# Patient Record
Sex: MALE | Race: WHITE
[De-identification: names, ages, dates, MRNs, and addresses within clinical notes are randomized per-mention and may not be internally consistent; named-entity substitution may affect disease eponyms.]

---

## 2020-01-08 ENCOUNTER — HOSPITAL ENCOUNTER (OUTPATIENT)
Dept: HOSPITAL 84 - D.LABREF | Age: 65
Discharge: HOME | End: 2020-01-08
Attending: ORTHOPAEDIC SURGERY
Payer: MEDICARE

## 2020-01-08 DIAGNOSIS — M17.12: Primary | ICD-10-CM

## 2020-01-22 ENCOUNTER — HOSPITAL ENCOUNTER (INPATIENT)
Dept: HOSPITAL 84 - D.SDCHOLD | Age: 65
LOS: 9 days | Discharge: HOME HEALTH | DRG: 470 | End: 2020-01-31
Attending: ORTHOPAEDIC SURGERY | Admitting: ORTHOPAEDIC SURGERY
Payer: MEDICARE

## 2020-01-22 VITALS
HEIGHT: 70 IN | BODY MASS INDEX: 28.98 KG/M2 | BODY MASS INDEX: 28.98 KG/M2 | WEIGHT: 202.42 LBS | BODY MASS INDEX: 28.98 KG/M2

## 2020-01-22 DIAGNOSIS — Z85.038: ICD-10-CM

## 2020-01-22 DIAGNOSIS — M17.12: Primary | ICD-10-CM

## 2020-01-22 DIAGNOSIS — I10: ICD-10-CM

## 2020-01-22 LAB
ANION GAP SERPL CALC-SCNC: 8.5 MMOL/L (ref 8–16)
APPEARANCE UR: CLEAR
APTT BLD: 27.1 SECONDS (ref 22.8–39.4)
BASOPHILS NFR BLD AUTO: 0.4 % (ref 0–2)
BILIRUB SERPL-MCNC: NEGATIVE MG/DL
BUN SERPL-MCNC: 10 MG/DL (ref 7–18)
CALCIUM SERPL-MCNC: 8.7 MG/DL (ref 8.5–10.1)
CHLORIDE SERPL-SCNC: 107 MMOL/L (ref 98–107)
CO2 SERPL-SCNC: 31.1 MMOL/L (ref 21–32)
COLOR UR: YELLOW
CREAT SERPL-MCNC: 1.1 MG/DL (ref 0.6–1.3)
EOSINOPHIL NFR BLD: 2 % (ref 0–7)
ERYTHROCYTE [DISTWIDTH] IN BLOOD BY AUTOMATED COUNT: 12.7 % (ref 11.5–14.5)
GLUCOSE SERPL-MCNC: 110 MG/DL (ref 74–106)
GLUCOSE SERPL-MCNC: NEGATIVE MG/DL
HCT VFR BLD CALC: 44.3 % (ref 42–54)
HGB BLD-MCNC: 15.5 G/DL (ref 13.5–17.5)
IMM GRANULOCYTES NFR BLD: 0.2 % (ref 0–5)
INR PPP: 1 (ref 0.85–1.17)
KETONES UR STRIP-MCNC: NEGATIVE MG/DL
LYMPHOCYTES NFR BLD AUTO: 25.2 % (ref 15–50)
MCH RBC QN AUTO: 31.9 PG (ref 26–34)
MCHC RBC AUTO-ENTMCNC: 35 G/DL (ref 31–37)
MCV RBC: 91.2 FL (ref 80–100)
MONOCYTES NFR BLD: 17 % (ref 2–11)
NEUTROPHILS NFR BLD AUTO: 55.2 % (ref 40–80)
NITRITE UR-MCNC: NEGATIVE MG/ML
OSMOLALITY SERPL CALC.SUM OF ELEC: 284 MOSM/KG (ref 275–300)
PH UR STRIP: 7 [PH] (ref 5–6)
PLATELET # BLD: 208 10X3/UL (ref 130–400)
PMV BLD AUTO: 11 FL (ref 7.4–10.4)
POTASSIUM SERPL-SCNC: 3.6 MMOL/L (ref 3.5–5.1)
PROT UR-MCNC: NEGATIVE MG/DL
PROTHROMBIN TIME: 13.2 SECONDS (ref 11.6–15)
RBC # BLD AUTO: 4.86 10X6/UL (ref 4.2–6.1)
SODIUM SERPL-SCNC: 143 MMOL/L (ref 136–145)
SP GR UR STRIP: 1.01 (ref 1–1.02)
UROBILINOGEN UR-MCNC: NORMAL MG/DL
WBC # BLD AUTO: 5.1 10X3/UL (ref 4.8–10.8)

## 2020-01-29 VITALS — SYSTOLIC BLOOD PRESSURE: 106 MMHG | DIASTOLIC BLOOD PRESSURE: 73 MMHG

## 2020-01-29 VITALS — DIASTOLIC BLOOD PRESSURE: 74 MMHG | SYSTOLIC BLOOD PRESSURE: 115 MMHG

## 2020-01-29 VITALS — DIASTOLIC BLOOD PRESSURE: 84 MMHG | SYSTOLIC BLOOD PRESSURE: 130 MMHG

## 2020-01-29 VITALS — SYSTOLIC BLOOD PRESSURE: 115 MMHG | DIASTOLIC BLOOD PRESSURE: 74 MMHG

## 2020-01-29 VITALS — DIASTOLIC BLOOD PRESSURE: 76 MMHG | SYSTOLIC BLOOD PRESSURE: 127 MMHG

## 2020-01-29 PROCEDURE — 0SRD0J9 REPLACEMENT OF LEFT KNEE JOINT WITH SYNTHETIC SUBSTITUTE, CEMENTED, OPEN APPROACH: ICD-10-PCS | Performed by: ORTHOPAEDIC SURGERY

## 2020-01-29 NOTE — NUR
REC'D WALKING ROUNDS CHGE OF SHIFT DRSG DRY AND INTACT LEFT KNEE WITH WOUND
VAC IN PLACE KELVIN FERRARO CPM [place] FOOT PINK AND WARM PEADAL PULSE PRESENT
STATES BLK HAS WORN
OFF ALREADY RATING PAIN5 ON 1-10 PAIN SCALE. WILL CONTINUE TO MONITOR FOR ANY
CHGES IN NEUROVASCULAR STATUS AND FOLLOW CURRENT PLAN OF CARE

## 2020-01-29 NOTE — NUR
OT NOTE: PT COMPLETED BED MOB WITH MOD A. PT COMPLETED FACE WASH WITH SETUP.
[phone]
THANK YOU, KISHA HAWTHORNE

## 2020-01-30 VITALS — SYSTOLIC BLOOD PRESSURE: 115 MMHG | DIASTOLIC BLOOD PRESSURE: 73 MMHG

## 2020-01-30 VITALS — DIASTOLIC BLOOD PRESSURE: 78 MMHG | SYSTOLIC BLOOD PRESSURE: 129 MMHG

## 2020-01-30 VITALS — SYSTOLIC BLOOD PRESSURE: 146 MMHG | DIASTOLIC BLOOD PRESSURE: 93 MMHG

## 2020-01-30 VITALS — SYSTOLIC BLOOD PRESSURE: 148 MMHG | DIASTOLIC BLOOD PRESSURE: 76 MMHG

## 2020-01-30 VITALS — SYSTOLIC BLOOD PRESSURE: 128 MMHG | DIASTOLIC BLOOD PRESSURE: 82 MMHG

## 2020-01-30 VITALS — SYSTOLIC BLOOD PRESSURE: 113 MMHG | DIASTOLIC BLOOD PRESSURE: 68 MMHG

## 2020-01-30 LAB
ALBUMIN SERPL-MCNC: 3.4 G/DL (ref 3.4–5)
ALP SERPL-CCNC: 51 U/L (ref 30–120)
ALT SERPL-CCNC: 22 U/L (ref 10–68)
ANION GAP SERPL CALC-SCNC: 13 MMOL/L (ref 8–16)
BASOPHILS NFR BLD AUTO: 0 % (ref 0–2)
BILIRUB SERPL-MCNC: 0.45 MG/DL (ref 0.2–1.3)
BUN SERPL-MCNC: 19 MG/DL (ref 7–18)
CALCIUM SERPL-MCNC: 8 MG/DL (ref 8.5–10.1)
CHLORIDE SERPL-SCNC: 104 MMOL/L (ref 98–107)
CO2 SERPL-SCNC: 27.4 MMOL/L (ref 21–32)
CREAT SERPL-MCNC: 1.3 MG/DL (ref 0.6–1.3)
EOSINOPHIL NFR BLD: 0 % (ref 0–7)
ERYTHROCYTE [DISTWIDTH] IN BLOOD BY AUTOMATED COUNT: 12.6 % (ref 11.5–14.5)
GLOBULIN SER-MCNC: 2.7 G/L
GLUCOSE SERPL-MCNC: 160 MG/DL (ref 74–106)
HCT VFR BLD CALC: 40.7 % (ref 42–54)
HGB BLD-MCNC: 14.2 G/DL (ref 13.5–17.5)
IMM GRANULOCYTES NFR BLD: 0.2 % (ref 0–5)
LYMPHOCYTES NFR BLD AUTO: 7.5 % (ref 15–50)
MCH RBC QN AUTO: 31.7 PG (ref 26–34)
MCHC RBC AUTO-ENTMCNC: 34.9 G/DL (ref 31–37)
MCV RBC: 90.8 FL (ref 80–100)
MONOCYTES NFR BLD: 9.7 % (ref 2–11)
NEUTROPHILS NFR BLD AUTO: 82.6 % (ref 40–80)
OSMOLALITY SERPL CALC.SUM OF ELEC: 283 MOSM/KG (ref 275–300)
PLATELET # BLD: 225 10X3/UL (ref 130–400)
PMV BLD AUTO: 11.5 FL (ref 7.4–10.4)
POTASSIUM SERPL-SCNC: 4.4 MMOL/L (ref 3.5–5.1)
PROT SERPL-MCNC: 6.1 G/DL (ref 6.4–8.2)
RBC # BLD AUTO: 4.48 10X6/UL (ref 4.2–6.1)
SODIUM SERPL-SCNC: 140 MMOL/L (ref 136–145)
WBC # BLD AUTO: 12.3 10X3/UL (ref 4.8–10.8)

## 2020-01-30 NOTE — OP
PATIENT NAME:  YUE FAROOQ                           MEDICAL RECORD: [id number]
:51                                             LOCATION:D.MS REED3
                                                         ADMISSION DATE:20
SURGEON:  YUE JOSEPH DO         
 
 
DATE OF OPERATION:  2020
 
PROCEDURE PERFORMED:  Left total knee arthroplasty.
 
PREOPERATIVE DIAGNOSIS:  Left knee osteoarthritis.
 
POSTOPERATIVE DIAGNOSIS:  Left knee osteoarthritis.
 
INDICATIONS:  Mr. Farooq is a 64-year-old male who has had left knee pain for
quite some time.  He has tried all manner of nonoperative treatment including
injections, physical therapy, and is tired of the left knee pain and is
affecting activities of daily living and he wants something done surgically.  I
informed her of the risks including infection, bleeding, damage to nerves and
vessels, need for further surgery, fracture, failure of implant, continued knee
pain, blood clots, continued numbness, and even death.  He signed the consent.
 
SURGEON:  Yue Joseph DO
 
DESCRIPTION OF PROCEDURE:  The patient received an adductor canal block in the
preoperative area by anesthesia, was taken to the operative suite, given a
spinal.  He was then laid in the left lateral decubitus position for about 5
minutes and laid on supine and given general anesthetic and LMA was placed.  He
was given 2 grams of Ancef and 80 mg gentamicin and a gram of TXA and then the
left lower extremity was prepped and draped in sterile fashion.  A timeout was
performed and everyone was in agreement with the correct side, site, patient and
procedure.   We then began by marking out the incision on the anterior knee and
then covered in Ioban.  I then used 10 blade scalpel to go down to the capsule
and thorough dissection was made down to the capsule.  A fresh 10 blade was used
through the medial parapatellar approach through the capsule.  The fat pad was
partially removed and any bleeding was coagulated with Aquamantys throughout the
procedure.  The tourniquet was not used.  The patella was then exposed and
milled down.  Then, the knee was flexed up and the ACL was removed and the drill
was used to enter the femoral canal.  Once femoral canal was entered, the distal
cutting guide was used and distal femur was cut.  The proximal tibia was then
cut and this was exposed.  A 2 mm were taken off the low portion of the medial
aspect and the knee was brought to extension.  Next, the menisci and any other
soft tissue was removed.  At that time, the medial and lateral and oblique was
closed with Aquamantys.  We then put the 10 extension block and was found to
have an MCL release.  The knee was then flexed up and the femur sized to a 70. 
Once femur was sized, the 4-in-1 cutting block was put on and the Nando wing was
used to ensure it would not notch and the 4-in-1 cutting block had been put on
and the cuts were made through the block.  The cutting block was removed and the
bone was removed.  The trial was then put on and the poly and tray were floated
in and range of rotation was marked.  I then drilled the lug holes for the femur
and for the patella implant.  The tibia was then exposed and sized to a 75 tray.
 This was reamed and then punched and then extra holes were put in the tibia for
the cement.  The cement was then mixed and placed on the tibial implant in the
tibia and the implant was impacted into place.  Excess cement was removed.  The
femur was impacted on and then the 10 poly was put in between them and brought
to extension and the patella was irrigated out and curetted out and then
cemented put in that hand on the implant and screws into place.  Excess cement
was removed.  We then irrigated the knee and then put povidine iodine in normal
 
 
 
OPERATIVE REPORT                               [id number]    YUE FAROOQ         
 
 
saline solution in the knee and set for 3 minutes.  After 3 minutes, it was then
irrigated out with over about a liter of normal saline and then the poly was
size 10 fit very well, had good stability medial laterally in flexion and
extension.  We then irrigated one more time and the final implant was put in and
locked into place by locking mechanism.  Pravin and vancomycin powder then
placed inside the knee and the capsule was closed with #2 Ethibond in
figure-of-eight fashion.  The skin was closed with 2-0 Vicryl interrupted
fashion and ZipLine was placed on the knee and then the VAC placed over that. 
He was then awakened and taken to recovery in stable condition.  Blood loss was
approximately 200 mL.
 
COMPLICATIONS:  None
 
TRANSINT:[id number] Voice Confirmation ID: [id number] DOCUMENT ID: [id number]
                                           
                                           YUE JOSEPH DO         
 
 
 
Electronically Signed by YUE ROCHE on 20 at 0834
 
 
 
 
 
 
 
 
 
 
 
 
 
 
 
 
 
 
 
 
 
 
 
 
 
 
 
CC:                                                             [id number]
DICTATION DATE: 20 1240     :     20 1703      ADM IN  
                                                                              
[facility]                                          
[address] [id number]

## 2020-01-30 NOTE — NUR
PT RESTING IN BED WITH EYS CLOSED, FAMILY AT BEDSIDE, AND CPM ON L KNEE. NO
ACUTE S/S OF DISTRESS. NO C/O AT THIS TIME. PATIENT HAS IV IN R WRIST 1/2
NORMAL SALINE. IV IS PATENT WITHOUT REDNESS, SWELLING, OR TEDNERNESS. PATIENT
HAD A TOTAL KNEE YESTERDAY WITH PREVENA VAC ON AND KLEVIN HOSE. PATIENT HAS A
COLOSTOMY THAT PATIENT TAKES CARE OF THEMSELVES. CALL LIGHT IN PLACE. WILL
CONTINUE TO MONITOR.

## 2020-01-31 VITALS — DIASTOLIC BLOOD PRESSURE: 71 MMHG | SYSTOLIC BLOOD PRESSURE: 114 MMHG

## 2020-01-31 VITALS — DIASTOLIC BLOOD PRESSURE: 85 MMHG | SYSTOLIC BLOOD PRESSURE: 138 MMHG

## 2020-01-31 VITALS — SYSTOLIC BLOOD PRESSURE: 115 MMHG | DIASTOLIC BLOOD PRESSURE: 69 MMHG

## 2020-01-31 LAB
ALBUMIN SERPL-MCNC: 2.9 G/DL (ref 3.4–5)
ALP SERPL-CCNC: 38 U/L (ref 30–120)
ALT SERPL-CCNC: 16 U/L (ref 10–68)
ANION GAP SERPL CALC-SCNC: 12.2 MMOL/L (ref 8–16)
BASOPHILS NFR BLD AUTO: 0.1 % (ref 0–2)
BILIRUB SERPL-MCNC: 0.54 MG/DL (ref 0.2–1.3)
BUN SERPL-MCNC: 20 MG/DL (ref 7–18)
CALCIUM SERPL-MCNC: 7.8 MG/DL (ref 8.5–10.1)
CHLORIDE SERPL-SCNC: 105 MMOL/L (ref 98–107)
CO2 SERPL-SCNC: 26.6 MMOL/L (ref 21–32)
CREAT SERPL-MCNC: 1.1 MG/DL (ref 0.6–1.3)
EOSINOPHIL NFR BLD: 0.1 % (ref 0–7)
ERYTHROCYTE [DISTWIDTH] IN BLOOD BY AUTOMATED COUNT: 12.7 % (ref 11.5–14.5)
GLOBULIN SER-MCNC: 2.8 G/L
GLUCOSE SERPL-MCNC: 119 MG/DL (ref 74–106)
HCT VFR BLD CALC: 36.1 % (ref 42–54)
HGB BLD-MCNC: 12.5 G/DL (ref 13.5–17.5)
IMM GRANULOCYTES NFR BLD: 0.3 % (ref 0–5)
LYMPHOCYTES NFR BLD AUTO: 17.7 % (ref 15–50)
MCH RBC QN AUTO: 31.6 PG (ref 26–34)
MCHC RBC AUTO-ENTMCNC: 34.6 G/DL (ref 31–37)
MCV RBC: 91.4 FL (ref 80–100)
MONOCYTES NFR BLD: 15.4 % (ref 2–11)
NEUTROPHILS NFR BLD AUTO: 66.4 % (ref 40–80)
OSMOLALITY SERPL CALC.SUM OF ELEC: 282 MOSM/KG (ref 275–300)
PLATELET # BLD: 176 10X3/UL (ref 130–400)
PMV BLD AUTO: 11.3 FL (ref 7.4–10.4)
POTASSIUM SERPL-SCNC: 3.8 MMOL/L (ref 3.5–5.1)
PROT SERPL-MCNC: 5.7 G/DL (ref 6.4–8.2)
RBC # BLD AUTO: 3.95 10X6/UL (ref 4.2–6.1)
SODIUM SERPL-SCNC: 140 MMOL/L (ref 136–145)
WBC # BLD AUTO: 7.3 10X3/UL (ref 4.8–10.8)

## 2020-01-31 NOTE — MORECARE
CASE MANAGEMENT DISCHARGE SUMMARY
 
 
PATIENT: YUE HOFF                     UNIT: [id number]
ACCOUNT#: [id number]                       ADM DATE: 20
AGE: 64     : [birth year]  SEX: M            ROOM/BED: D.2203    
AUTHOR: AMPARO,DOC                             PHYSICIAN:                               
 
REFERRING PHYSICIAN: YUE JOSEPH DO         
DATE OF SERVICE: 20
Discharge Plan
 
 
Patient Name: YUE HOFF
Facility: [facility]:[place]
Encounter #: [id number]
Medical Record #: [id number]
: 1955
Planned Disposition: Home with Home Health
Anticipated Discharge Date: 
 
Discharge Date: 
Expected LOS: 
Initial Reviewer: [id number]
Initial Review Date: 2020
Generated: 20   9:41 am 
Comments
 
DCP- Discharge Planning
 
Updated by [email or web address]: Marjorie Archibald on 20   7:36 am CT
Patient Name: YUE HOFF                                     
Admission Status: Elective   
Accout number: [id number]                              
Admission Date: 2020   
: 1955                                                        
Admission Diagnosis:   
Attending: YUE JOSEPH                                                
Current LOS:  2   
  
Anticipated DC Date:    
Planned Disposition: Home with Home Health   
Primary Insurance: [employer] ADV   
  
  
Discharge Planning Comments:   
  
CM met with patient to complete initial dc planning assessment.  CM educated 
patient on the CM role and verbal consent given by patient to complete 
assessment.   Patient lives at home with his spouse where he is independent 
 
with his care.  At discharge patient plans to return home and feels this is a 
safe discharge.  CM discussed availability of home health, rehab services, 
and medical equipment. He has a CPM, walker, Ice Machine, BSC that was set up 
by Dr Joseph's office.  He would like to use [facility] in [place] 
for his PT. He states that he lives to far to come into town for PT.  Patient 
denied  any other known discharge needs at this time. CM will continue to 
follow and will assist as needed with dc plans/needs.    
  
  
  
  
: Marjorie Archibald
 DCPIA - Discharge Planning Initial Assessment
 
Updated by [email or web address]: Marjorie Archibald on 20   8:33 am
*  Is the patient Alert and Oriented?
Yes
*  How many steps to enter\exit or inside your home?
 
*  PCP
[facility]
*  Pharmacy
[facility]
*  Preadmission Environment
Home with Family
*  ADLs
Independent
*  Equipment
Bedside Commode
Rolling Walker
*  Other Equipment
CPM  ICE MACHINE
 
*  List name and contact numbers for known caregivers / representatives who 
currently or will assist patient after discharge:
HERMANN HOFF [phone]
*  Verbal permission to speak to the caregivers and representatives has been 
obtained from the patient.
N/A
*  Community resources currently utilized
None
*  Additional services required to return to the preadmission environment?
Yes
*  Can the patient safely return to the preadmission environment?
Yes
*  Has this patient been hospitalized within the prior 30 days at any 
hospital?
No
 
 
 
 
 
 
Coverage Notice
 
Reviewer: [email or web address] Derek Archibald
 
Notice Issued Date-Time: 2020   7:50
Notice Type: Patient Choice Letter
 
Notice Delivered To: Patient
Relationship to Patient: 
Representative Name: 
 
Delivery Method: HAND - Hand Delivered
Jennifer Days:
Prior Verbal Notification: 
 
Recipient Understood Notice: Yes
Recipient Signature: Yes
Med Rec Note Co-signed by Attending:
 
Coverage Notice Comment:  susy for elite in slick
 
Last DP export: 20   7:34 a
Patient Name: YUE HOFF
Encounter #: [id number]
Page [id number]
 
 
 
 
 
Electronically Signed by RICK CHRISTENSEN on 20 at 0841
 
 
 
 
 
 
**All edits/amendments must be made on the electronic document**
 
DICTATION DATE: 20     : MARS  20     
RPT#: [id number]                                DC DATE:        
                                               STATUS: ADM IN  
[facility]
 [place] [address]
***END OF REPORT***

## 2020-01-31 NOTE — MORECARE
CASE MANAGEMENT DISCHARGE SUMMARY
 
 
PATIENT: YUE HOFF                     UNIT: [id number]
ACCOUNT#: [id number]                       ADM DATE: 20
AGE: 64     : [birth year]  SEX: M            ROOM/BED: D.2203    
AUTHOR: RICK CHRISTENSEN                             PHYSICIAN:                               
 
REFERRING PHYSICIAN: YUE JOSEPH DO         
DATE OF SERVICE: 20
Discharge Plan
 
 
Patient Name: YUE HOFF
[facility]
Encounter #: [id number]
Medical Record #: [id number]
: 1955
Planned Disposition: Home with Home Health
Anticipated Discharge Date: 
 
Discharge Date: 
Expected LOS: 
Initial Reviewer: [id number]
Initial Review Date: 2020
Generated: 20   9:34 am 
 DCPIA - Discharge Planning Initial Assessment
 
Updated by [email or web address]: Marjorie Archibald on 20   8:33 am
*  Is the patient Alert and Oriented?
Yes
*  How many steps to enter\exit or inside your home?
 
*  PCP
[facility]
*  Pharmacy
[facility]
*  Preadmission Environment
Home with Family
*  ADLs
Independent
*  Equipment
Bedside Commode
Rolling Walker
*  Other Equipment
CPM  ICE MACHINE
 
*  List name and contact numbers for known caregivers / representatives who 
currently or will assist patient after discharge:
HERMANN HOFF [phone]
 
*  Verbal permission to speak to the caregivers and representatives has been 
obtained from the patient.
N/A
*  Community resources currently utilized
None
*  Additional services required to return to the preadmission environment?
Yes
*  Can the patient safely return to the preadmission environment?
Yes
*  Has this patient been hospitalized within the prior 30 days at any 
hospital?
No
 
 
 
 
 
 
Patient Name: YUE HOFF
Encounter #: [id number]
Page [phone]
 
 
 
 
 
Electronically Signed by RICK CHRISTENSEN on 20 at 0835
 
 
 
 
 
 
**All edits/amendments must be made on the electronic document**
 
DICTATION DATE: 20     : MARS  20     
RPT#: [id number]                                DC DATE:        
                                               STATUS: ADM IN  
[facility]
 [place] [address]
***END OF REPORT***

## 2020-01-31 NOTE — NUR
IV DISCONTINUED AND VERBALIZED UNDERSTANDING OF DISCHARGE INSTRUCTIONS. STABLE
AT TIME OF DEPARTURE WITH PORVENA VAC INTAACT TO LEFT KNEE.

## 2020-01-31 NOTE — NUR
ALERT AND ORIENTED X4 DRESSING INTACT TO LLE WITH PEDAL PULSES NOTED. UP
AMBULATING WITH R/WALKER WITH TORDOL GIVEN AND DILAUDID FOR PAIN MANAGEMENT
AND EFFECTIVE. COLOSTOMY INTACT. IVF INFUSING AT PRESCRIBED RATE WITH DR. JOSEPH HERE WITH ORDERS FOR DISCHARGE. ENCOURAGED TO USE CALL LIGHT FOR
ASSSIT.

## 2020-02-01 NOTE — MORECARE
CASE MANAGEMENT DISCHARGE SUMMARY
 
 
PATIENT: YUE HOFF                     UNIT: [id number]
ACCOUNT#: [id number]                       ADM DATE: 20
AGE: 64     : 51  SEX: M            ROOM/BED: D.2203    
AUTHOR: AMPARO,DOC                             PHYSICIAN:                               
 
REFERRING PHYSICIAN: YUE JOSEPH DO         
DATE OF SERVICE: 20
Discharge Plan
 
 
Patient Name: YUE HOFF
Facility: [facility]:[place]
Encounter #: [id number]
Medical Record #: [id number]
: 1955
Planned Disposition: Home with Home Health
Anticipated Discharge Date: 
 
Discharge Date: 2020
Expected LOS: 
Initial Reviewer: [email or web address]
Initial Review Date: 2020
Generated: 20   4:48 pm 
Comments
 
DCP- Discharge Planning
 
Updated by [id number]: Marjorie Archibald on 20   7:36 am CT
Patient Name: YUE HOFF                                     
Admission Status: Elective   
Accout number: [id number]                              
Admission Date: 2020   
: 1955                                                        
Admission Diagnosis:   
Attending: YUE JOSEPH                                                
Current LOS:  2   
  
Anticipated DC Date:    
Planned Disposition: Home with Home Health   
Primary Insurance: [employer] ADV   
  
  
Discharge Planning Comments:   
  
CM met with patient to complete initial dc planning assessment.  CM educated 
patient on the CM role and verbal consent given by patient to complete 
assessment.   Patient lives at home with his spouse where he is independent 
 
with his care.  At discharge patient plans to return home and feels this is a 
safe discharge.  CM discussed availability of home health, rehab services, 
and medical equipment. He has a CPM, walker, Ice Machine, BSC that was set up 
by Dr Joseph's office.  He would like to use [facility] in [place] 
for his PT. He states that he lives to far to come into town for PT.  Patient 
denied  any other known discharge needs at this time. CM will continue to 
follow and will assist as needed with dc plans/needs.    
  
  
  
  
: Marjorie Archibald
 DCPIA - Discharge Planning Initial Assessment
 
Updated by [email or web address]: Marjorie Archibald on 20   8:33 am
*  Is the patient Alert and Oriented?
Yes
*  How many steps to enter\exit or inside your home?
 
*  PCP
[facility]
*  Pharmacy
[facility]
*  Preadmission Environment
Home with Family
*  ADLs
Independent
*  Equipment
Bedside Commode
Rolling Walker
*  Other Equipment
CPM  ICE MACHINE
 
*  List name and contact numbers for known caregivers / representatives who 
currently or will assist patient after discharge:
HERMANN HOFF [phone]
*  Verbal permission to speak to the caregivers and representatives has been 
obtained from the patient.
N/A
*  Community resources currently utilized
None
*  Additional services required to return to the preadmission environment?
Yes
*  Can the patient safely return to the preadmission environment?
Yes
*  Has this patient been hospitalized within the prior 30 days at any 
hospital?
No
 
 
 
 
 
 
Coverage Notice
 
Reviewer: [email or web address] Derek Archibald
 
Notice Issued Date-Time: 2020   7:50
Notice Type: Patient Choice Letter
 
Notice Delivered To: Patient
Relationship to Patient: 
Representative Name: 
 
Delivery Method: HAND - Hand Delivered
Jennifer Days:
Prior Verbal Notification: 
 
Recipient Understood Notice: Yes
Recipient Signature: Yes
Med Rec Note Co-signed by Attending:
 
Coverage Notice Comment:  susy for elite in slick
 
Last DP export: 20   8:01 a
Patient Name: YUE HOFF
Encounter #: [id number]
Page [id number]
 
 
 
 
 
Electronically Signed by RICK CHRISTENSEN on 20 at 1549
 
 
 
 
 
 
**All edits/amendments must be made on the electronic document**
 
DICTATION DATE: 20 1548     : MARS  20 1548     
RPT#: [id number]                                DC DATE:20
                                               STATUS: DIS IN  
[facility]
[address] [place] [address]
***END OF REPORT***

## 2020-09-03 ENCOUNTER — HOSPITAL ENCOUNTER (OUTPATIENT)
Dept: HOSPITAL 84 - D.LABREF | Age: 65
Discharge: HOME | End: 2020-09-03
Attending: CLINICAL NURSE SPECIALIST
Payer: MEDICARE

## 2020-09-03 VITALS — BODY MASS INDEX: 29 KG/M2

## 2020-09-03 DIAGNOSIS — M25.562: Primary | ICD-10-CM

## 2020-09-03 LAB
BASOPHILS NFR BLD AUTO: 0.4 % (ref 0–2)
EOSINOPHIL NFR BLD: 3.1 % (ref 0–7)
ERYTHROCYTE [DISTWIDTH] IN BLOOD BY AUTOMATED COUNT: 13.2 % (ref 11.5–14.5)
ERYTHROCYTE [SEDIMENTATION RATE] IN BLOOD: 3 MM/HR (ref 0–20)
HCT VFR BLD CALC: 45.3 % (ref 42–54)
HGB BLD-MCNC: 15.7 G/DL (ref 13.5–17.5)
IMM GRANULOCYTES NFR BLD: 0 % (ref 0–5)
LYMPHOCYTES NFR BLD AUTO: 28.9 % (ref 15–50)
MCH RBC QN AUTO: 31.2 PG (ref 26–34)
MCHC RBC AUTO-ENTMCNC: 34.7 G/DL (ref 31–37)
MCV RBC: 90.1 FL (ref 80–100)
MONOCYTES NFR BLD: 11.2 % (ref 2–11)
NEUTROPHILS NFR BLD AUTO: 56.4 % (ref 40–80)
PLATELET # BLD: 208 10X3/UL (ref 130–400)
PMV BLD AUTO: 12 FL (ref 7.4–10.4)
RBC # BLD AUTO: 5.03 10X6/UL (ref 4.2–6.1)
WBC # BLD AUTO: 4.5 10X3/UL (ref 4.8–10.8)